# Patient Record
Sex: FEMALE | Race: WHITE | Employment: UNEMPLOYED | URBAN - METROPOLITAN AREA
[De-identification: names, ages, dates, MRNs, and addresses within clinical notes are randomized per-mention and may not be internally consistent; named-entity substitution may affect disease eponyms.]

---

## 2017-08-15 ENCOUNTER — OFFICE VISIT (OUTPATIENT)
Dept: VASCULAR SURGERY | Age: 42
End: 2017-08-15

## 2017-08-15 VITALS
HEART RATE: 68 BPM | RESPIRATION RATE: 13 BRPM | WEIGHT: 140 LBS | BODY MASS INDEX: 21.97 KG/M2 | HEIGHT: 67 IN | SYSTOLIC BLOOD PRESSURE: 118 MMHG | DIASTOLIC BLOOD PRESSURE: 76 MMHG

## 2017-08-15 DIAGNOSIS — I89.0 LYMPHEDEMA OF BOTH LOWER EXTREMITIES: Primary | ICD-10-CM

## 2017-08-15 RX ORDER — LORATADINE 10 MG/1
10 TABLET ORAL
COMMUNITY

## 2017-08-15 RX ORDER — DROSPIRENONE AND ETHINYL ESTRADIOL 0.02-3(28)
KIT ORAL DAILY
COMMUNITY

## 2017-08-15 NOTE — MR AVS SNAPSHOT
Visit Information Date & Time Provider Department Dept. Phone Encounter #  
 8/15/2017 11:00 AM MD [facility] [phone] [id number] Follow-up Instructions Return in about 4 weeks (around 9/12/2017). Follow-up and Disposition History Upcoming Health Maintenance Date Due DTaP/Tdap/Td series (1 - Tdap) 6/16/1996 PAP AKA CERVICAL CYTOLOGY 6/16/1996 INFLUENZA AGE 9 TO ADULT 8/1/2017 Allergies as of 8/15/2017  Review Complete On: 8/15/2017 By: Steven Castorena MD  
 No Known Allergies Current Immunizations  Never Reviewed No immunizations on file. Not reviewed this visit You Were Diagnosed With   
  
 Codes Comments Lymphedema of both lower extremities    -  Primary ICD-10-CM: I89.0 ICD-9-CM: 283.8 Vitals BP Pulse Resp Height(growth percentile) Weight(growth percentile) BMI  
 118/76 (BP 1 Location: Left arm, BP Patient Position: Sitting) 68 13 5' 7\" (1.702 m) 140 lb (63.5 kg) 21.93 kg/m2 Smoking Status Never Smoker BMI and BSA Data Body Mass Index Body Surface Area  
 21.93 kg/m 2 1.73 m 2 Your Updated Medication List  
  
   
This list is accurate as of: 8/15/17 11:40 AM.  Always use your most recent med list.  
  
  
  
  
 CLARITIN 10 mg tablet Generic drug:  loratadine Take 10 mg by mouth. TREVOR (28) 3-0.02 mg Tab Generic drug:  drospirenone-ethinyl estradiol Take  by mouth daily. Follow-up Instructions Return in about 4 weeks (around 9/12/2017). To-Do List   
 08/18/2017 2:00 PM  
  Appointment with [facility] at [facility] ([phone]) DIET RESTRICTIONS  Nothing to eat or drink 4 hours prior to study May have water to take meds  GENERAL INSTRUCTIONS  If you were given medications to take for a contrast allergy prior to having this study, please arrange to have someone drive you to your appointment. If you have had a creatinine level drawn within the past 30 days, please bring most recent results to your appt. This study does not require you to drink contrast prior to your study. MEDICATIONS  Bring a complete list of all medications you are currently taking to include prescriptions, over-the-counter meds, herbals, vitamins & any dietary supplements. OUTSIDE FILMS  Bring outside films, CDs, and reports related to the study with you on the day of your exam.  QUESTIONS  Notify the CT Department if you have any questions concerning your study. [facility] [phone] [facility] 127 [phone]  
  
 08/25/2017 Imaging:  CTA ABDOMEN PELV W CONT Please provide this summary of care documentation to your next provider. If you have any questions after today's visit, please call [phone].

## 2017-08-15 NOTE — PROGRESS NOTES
Tania Meek    Chief Complaint   Patient presents with    New Patient    Varicose Veins       History and Physical    55-year-old female here for evaluation of leg edema. She has no ongoing medical problems tells me she is a very healthy person. Over the past 10 years she has had issues with edema initially only on the right leg. This edema occurs at the foot and ankle. She was standing outside and had multiple ant bites on her left leg and now has chronic edema left leg that goes all the way up to the knee. She is a very disciplined person she wears, stockings constantly during the daytime. She walks on a treadmill stays physically fit. Her leg swelling does go down overnight but re-accumulates every day even with some skin weeping initially in the mornings nearly daily. She has not had any recent cellulitis or infections or ulcers. No shortness of breath no chest pain. She did have history of local melanoma without metastasis right thigh lymph nodes were nonenlarged at that time. She had a CT and full workup for her melanoma in 2011. Certainly there was a concern of the time the lymphedema was from the melanoma but has drawn no correlation especially now the vein bilateral    Past Medical History:   Diagnosis Date    Cancer [facility])      Patient Active Problem List   Diagnosis Code    Lymphedema of both lower extremities I89.0     Past Surgical History:   Procedure Laterality Date    BREAST SURGERY PROCEDURE UNLISTED       Current Outpatient Prescriptions   Medication Sig Dispense Refill    drospirenone-ethinyl estradiol (TREVOR, 28,) 3-0.02 mg tab Take  by mouth daily.  loratadine (CLARITIN) 10 mg tablet Take 10 mg by mouth. No Known Allergies  Social History     Social History    Marital status:      Spouse name: N/A    Number of children: N/A    Years of education: N/A     Occupational History    Not on file.      Social History Main Topics    Smoking status: Never Smoker  Smokeless tobacco: Never Used    Alcohol use Not on file    Drug use: Not on file    Sexual activity: Not on file     Other Topics Concern    Not on file     Social History Narrative    No narrative on file      History reviewed. No pertinent family history. Review of Systems    No history of seizure or stroke, nondiabetic no thyroid issues, denies joint pain, no constipation or diarrhea, no chest pain no hypertension, no history of renal failure or kidney stones, no history of DVT has been ruled out and even had reflux testing with minimal findings, no claudication, chronic leg edema see above, no chronic ulceration    Physical Exam:    Visit Vitals    /76 (BP 1 Location: Left arm, BP Patient Position: Sitting)    Pulse 68    Resp 13    Ht 5' 7\" (1.702 m)    Wt 140 lb (63.5 kg)    BMI 21.93 kg/m2      Healthy youthful appearing 42 no distress  Head is normocephalic pupils are reactive  No facial symmetry notable  Vision speech and hearing intact  Neck no JVD  Chest clear  Cardiac regular  Abdomen soft nondistended  Extremities range of motion strength seem equal  Hard to palpate pulses but hand-held Doppler demonstrates brisk pulses bilateral  Right leg edema noted at toes foot ankle  Left leg edema noted the toes foot ankle and leg to knee  No open ulceration seen     Impression and Plan:    ICD-10-CM ICD-9-CM    1.  Lymphedema of both lower extremities I89.0 457.1 CTA ABDOMEN PELV W CONT   We talked about different stockings for different needs directed toward athletic type stocking she can wear while she is walking, explained she needs 2030 in strength at least  Enrolling her in lymphedema physical therapy  Discussed lymphedema pump, she will evaluate this  We will send for CTA to rule out may Thurner syndrome with his left leg being worse than the right also evaluate any lymphatic enlargement  Orders Placed This Encounter    CTA ABDOMEN PELV W CONT    drospirenone-ethinyl estradiol (TREVOR, 28,) 3-0.02 mg tab    loratadine (CLARITIN) 10 mg tablet       Follow-up Disposition:  Return in about 4 weeks (around 9/12/2017). Jadon Mustafa MD    PLEASE NOTE:  This document has been produced using voice recognition software. Unrecognized errors in transcription may be present.

## 2017-08-18 ENCOUNTER — HOSPITAL ENCOUNTER (OUTPATIENT)
Dept: CT IMAGING | Age: 42
Discharge: HOME | End: 2017-08-18
Attending: SURGERY
Payer: OTHER GOVERNMENT

## 2017-08-18 DIAGNOSIS — I89.0 LYMPHEDEMA OF BOTH LOWER EXTREMITIES: ICD-10-CM

## 2017-08-18 PROCEDURE — 74174 CTA ABD&PLVS W/CONTRAST: CPT

## 2017-08-18 PROCEDURE — 74011636320 HC RX REV CODE- 636/320: Performed by: SURGERY

## 2017-08-18 RX ADMIN — IOPAMIDOL 100 ML: 755 INJECTION, SOLUTION INTRAVENOUS at 14:00

## 2017-09-19 ENCOUNTER — OFFICE VISIT (OUTPATIENT)
Dept: VASCULAR SURGERY | Age: 42
End: 2017-09-19

## 2017-09-19 VITALS
BODY MASS INDEX: 21.97 KG/M2 | DIASTOLIC BLOOD PRESSURE: 76 MMHG | RESPIRATION RATE: 18 BRPM | HEIGHT: 67 IN | SYSTOLIC BLOOD PRESSURE: 112 MMHG | HEART RATE: 64 BPM | WEIGHT: 140 LBS

## 2017-09-19 DIAGNOSIS — I89.0 LYMPHEDEMA OF BOTH LOWER EXTREMITIES: ICD-10-CM

## 2017-09-19 DIAGNOSIS — R60.0 BILATERAL LEG EDEMA: Primary | ICD-10-CM

## 2017-09-19 NOTE — MR AVS SNAPSHOT
Visit Information Date & Time Provider Department Dept. Phone Encounter #  
 9/19/2017  1:15 PM MD [facility] [phone] [id number] Follow-up Instructions Return in about 3 months (around 12/19/2017). Upcoming Health Maintenance Date Due DTaP/Tdap/Td series (1 - Tdap) 6/16/1996 PAP AKA CERVICAL CYTOLOGY 6/16/1996 INFLUENZA AGE 9 TO ADULT 8/1/2017 Allergies as of 9/19/2017  Review Complete On: 9/19/2017 By: MD Cristina  
 No Known Allergies Current Immunizations  Never Reviewed No immunizations on file. Not reviewed this visit You Were Diagnosed With   
  
 Codes Comments Bilateral leg edema    -  Primary ICD-10-CM: R60.0 ICD-9-CM: 443. 3 Lymphedema of both lower extremities     ICD-10-CM: I89.0 ICD-9-CM: 107.5 Vitals BP Pulse Resp Height(growth percentile) Weight(growth percentile) BMI  
 112/76 (BP 1 Location: Left arm, BP Patient Position: Sitting) 64 18 5' 7\" (1.702 m) 140 lb (63.5 kg) 21.93 kg/m2 Smoking Status Never Smoker Vitals History BMI and BSA Data Body Mass Index Body Surface Area  
 21.93 kg/m 2 1.73 m 2 Your Updated Medication List  
  
   
This list is accurate as of: 9/19/17  2:22 PM.  Always use your most recent med list.  
  
  
  
  
 CLARITIN 10 mg tablet Generic drug:  loratadine Take 10 mg by mouth. TREVOR (28) 3-0.02 mg Tab Generic drug:  drospirenone-ethinyl estradiol Take  by mouth daily. Follow-up Instructions Return in about 3 months (around 12/19/2017). To-Do List   
 12/19/2017 Imaging:  DUPLEX LOWER EXT VENOUS BILAT AMB Please provide this summary of care documentation to your next provider. Your primary care clinician is listed as Phys Other. If you have any questions after today's visit, please call [phone].

## 2017-09-19 NOTE — PROGRESS NOTES
Paofarhan Ruiz    Chief Complaint   Patient presents with    Swelling       History and Physical    41-year-old female following up today regarding her bilateral leg edema. She  historically had lymphedema on her right leg with this has in the past few years improved now after a experience with multiple ant bites has had tremendous lymphedema of her left leg. She upgraded to some 20/30 compression stockings and has been wearing these faithfully tells me her legs do seem improved she is also been fitted for and is using her tactile medical lymphedema pump which she tells me is helpful. [facility] She is excited to start this to add this to her care. She has had no chest pain shortness of breath no adverse effects from compression    Past Medical History:   Diagnosis Date    Cancer [facility])      Patient Active Problem List   Diagnosis Code    Lymphedema of both lower extremities I89.0     Past Surgical History:   Procedure Laterality Date    BREAST SURGERY PROCEDURE UNLISTED       Current Outpatient Prescriptions   Medication Sig Dispense Refill    drospirenone-ethinyl estradiol (TREVOR, 28,) 3-0.02 mg tab Take  by mouth daily.  loratadine (CLARITIN) 10 mg tablet Take 10 mg by mouth. No Known Allergies    Review of Systems    A full review of systems was completed times ten organ systems and was deemed negative unless otherwise mentioned in the HPI.     Physical   Visit Vitals    /76 (BP 1 Location: Left arm, BP Patient Position: Sitting)    Pulse 64    Resp 18    Ht 5' 7\" (1.702 m)    Wt 140 lb (63.5 kg)    BMI 21.93 kg/m2        healthy-appearing 42  Head is normocephalic  Pupils are reactive  Neck no JVD  Chest clear  Cardiac regular  Abdomen soft nontender bilateral leg swelling minimal in the right  Moderate  on the left appearance of lymphedema  No skin ulceration  Vascular intact  CAT scan shows what is read as normal aortoiliac segment no  convincing evidence of May Thurner syndrome. Impression/Plan:     ICD-10-CM ICD-9-CM    1. Bilateral leg edema R60.0 782.3 DUPLEX LOWER EXT VENOUS BILAT AMB   2. Lymphedema of both lower extremities I89.0 457.1      She can continue with her compression therapy and lymphedema pump  We will enroll her in physical therapy for lymphedema  We will see her back in 3 months after having worn the stockings for reflux study  Orders Placed This Encounter    DUPLEX LOWER EXT VENOUS BILAT AMB (Reflux)       Follow-up Disposition:  Return in about 3 months (around 12/19/2017). Lisandra Torres MD    PLEASE NOTE:  This document has been produced using voice recognition software. Unrecognized errors in transcription may be present.

## 2017-11-09 ENCOUNTER — DOCUMENTATION ONLY (OUTPATIENT)
Dept: VASCULAR SURGERY | Age: 42
End: 2017-11-09

## 2017-12-20 ENCOUNTER — OFFICE VISIT (OUTPATIENT)
Dept: VASCULAR SURGERY | Age: 42
End: 2017-12-20

## 2017-12-20 DIAGNOSIS — R60.0 BILATERAL LEG EDEMA: ICD-10-CM

## 2017-12-20 NOTE — PROCEDURES
[facility]   *** FINAL REPORT ***    Name: Yanira Alejandro  MRN: [id number]       Outpatient  : 1975  HIS Order #: [id number]  [id number] [facility] Visit #: [id number]  Date: 20 Dec 2017    TYPE OF TEST: Peripheral Venous Testing    REASON FOR TEST  Edema    Right Leg:-  Deep venous thrombosis:           No  Superficial venous thrombosis:    No  Deep venous insufficiency:        No  Superficial venous insufficiency: No    Left Leg:-  Deep venous thrombosis:           No  Superficial venous thrombosis:    No  Deep venous insufficiency:        No  Superficial venous insufficiency: No      INTERPRETATION/FINDINGS  Duplex images were obtained using 2-D gray scale, color flow and  spectral doppler analysis. The reflux exam was performed in the reverse   trendelenburg position. 1. No evidence of deep vein thrombosis or deep venous reflux in the  common femoral, proximal deep femoral, femoral, popliteal, posterior  tibial and peroneal veins bilaterally. 2. No evidence of great saphenous vein reflux from the sapheno-femoral   junction to proximal calf bilaterally. 3. No evidence of small saphenous vein reflux bilaterally in the  proximal, mid and distal segments. The junction was not visualized as   the vein extends up the posterior thigh. 4. Triphasic posterior tibial artery flow bilaterally. ADDITIONAL COMMENTS    I have personally reviewed the data relevant to the interpretation of  this  study. TECHNOLOGIST: Aliza Marley RVT, MARIA FERNANDA  Signed: 2017 03:40 PM    PHYSICIAN: Ml Mosley D.O.   Signed: 2017 02:56 PM

## 2018-01-16 ENCOUNTER — OFFICE VISIT (OUTPATIENT)
Dept: VASCULAR SURGERY | Age: 43
End: 2018-01-16

## 2018-01-16 VITALS
WEIGHT: 140 LBS | RESPIRATION RATE: 16 BRPM | HEIGHT: 67 IN | DIASTOLIC BLOOD PRESSURE: 74 MMHG | BODY MASS INDEX: 21.97 KG/M2 | SYSTOLIC BLOOD PRESSURE: 122 MMHG | HEART RATE: 66 BPM

## 2018-01-16 DIAGNOSIS — I89.0 LYMPHEDEMA OF BOTH LOWER EXTREMITIES: Primary | ICD-10-CM

## 2018-01-16 NOTE — MR AVS SNAPSHOT
[address] 
[phone] Patient: Mindi Baeza MRN: [id number] DFR:[birth year] Visit Information Date & Time Provider Department Dept. Phone Encounter #  
 1/16/2018 10:30 AM MD [facility] [phone] [id number] Follow-up Instructions Return in about 1 year (around 1/16/2019). Follow-up and Disposition History Upcoming Health Maintenance Date Due DTaP/Tdap/Td series (1 - Tdap) 6/16/1996 PAP AKA CERVICAL CYTOLOGY 6/16/1996 Influenza Age 5 to Adult 8/1/2017 Allergies as of 1/16/2018  Review Complete On: 1/16/2018 By: Homero Venegas MD  
 No Known Allergies Current Immunizations  Never Reviewed No immunizations on file. Not reviewed this visit You Were Diagnosed With   
  
 Codes Comments Lymphedema of both lower extremities    -  Primary ICD-10-CM: I89.0 ICD-9-CM: 152.5 Vitals BP Pulse Resp Height(growth percentile) Weight(growth percentile) BMI  
 122/74 (BP 1 Location: Left arm, BP Patient Position: Sitting) 66 16 5' 7\" (1.702 m) 140 lb (63.5 kg) 21.93 kg/m2 Smoking Status Never Smoker Vitals History BMI and BSA Data Body Mass Index Body Surface Area  
 21.93 kg/m 2 1.73 m 2 Your Updated Medication List  
  
   
This list is accurate as of: 1/16/18 11:59 AM.  Always use your most recent med list.  
  
  
  
  
 CLARITIN 10 mg tablet Generic drug:  loratadine Take 10 mg by mouth. TREVOR (28) 3-0.02 mg Tab Generic drug:  drospirenone-ethinyl estradiol Take  by mouth daily. Follow-up Instructions Return in about 1 year (around 1/16/2019). Please provide this summary of care documentation to your next provider. Your primary care clinician is listed as Phys Other.  If you have any questions after today's visit, please call [phone].

## 2018-01-16 NOTE — PROGRESS NOTES
Nilda Solares    Chief Complaint   Patient presents with    Swelling       History and Physical    Most pleasant 70-year-old female here following up today regarding the care of her lymphedema. She tells me she has done very well with her compression therapy. She is also using lymphedema pump which he tells me she is quite pleased with. She uses this daily has become a very important part of her life. She works at [employer] in [place] has been able to maintain comfort at work with her stockings and the lymphedema pump. She did not go for the lymphedema physical therapy but would keep this is thought going forward. No ulceration no acute flareups. No shortness of breath or chest pain. Past Medical History:   Diagnosis Date    Cancer [facility])      Patient Active Problem List   Diagnosis Code    Lymphedema of both lower extremities I89.0     Past Surgical History:   Procedure Laterality Date    BREAST SURGERY PROCEDURE UNLISTED       Current Outpatient Prescriptions   Medication Sig Dispense Refill    drospirenone-ethinyl estradiol (TREVOR, 28,) 3-0.02 mg tab Take  by mouth daily.  loratadine (CLARITIN) 10 mg tablet Take 10 mg by mouth. No Known Allergies    Review of Systems    A full review of systems was completed times ten organ systems and was deemed negative unless otherwise mentioned in the HPI.     Physical   Visit Vitals    /74 (BP 1 Location: Left arm, BP Patient Position: Sitting)    Pulse 66    Resp 16    Ht 5' 7\" (1.702 m)    Wt 140 lb (63.5 kg)    BMI 21.93 kg/m2       Healthy-appearing 42 no distress  Head is normocephalic  Neck no JVD  Chest clear  Cardiac regular  Abdomen soft nondistended  Lower extremities she has tonic edema stable in appearance left side worse than right, affects both legs  No ulceration notable  Reflux Doppler shows no significant reflux of the superficial system  Historical CT shows no convincing evidence of CT venous disorder    Impression/Plan:     ICD-10-CM ICD-9-CM    1. Lymphedema of both lower extremities I89.0 457.1      No orders of the defined types were placed in this encounter. Responds well to conservative care  Continue with stockings, give her prescription for 3040 knee-high pair to see if she likes this  To continue with the lymphedema pump  We talked about using lymphedema physical therapy if she has flareups or worsening of edema  Also talked about if she is at the beach or has days where it is hard to wear the stockings to plan and elevation at the end of the day  Also pleased to hear her when she told me that wearing the stockings gives her a new comfort and confidence with her health    Follow-up Disposition:  Return in about 1 year (around 1/16/2019). Morena Hernandez MD    PLEASE NOTE:  This document has been produced using voice recognition software. Unrecognized errors in transcription may be present.

## 2018-06-27 ENCOUNTER — TELEPHONE (OUTPATIENT)
Dept: VASCULAR SURGERY | Age: 43
End: 2018-06-27

## 2018-06-27 NOTE — TELEPHONE ENCOUNTER
Patient called and needs RX for knee high stockings sent to her, she took it to [facility] and they lost it. She has found another company she is going to try to get her stocking from. She would like them mailed to her.

## 2019-05-30 ENCOUNTER — APPOINTMENT (OUTPATIENT)
Dept: GENERAL RADIOLOGY | Age: 44
End: 2019-05-30
Attending: PHYSICIAN ASSISTANT
Payer: OTHER GOVERNMENT

## 2019-05-30 ENCOUNTER — HOSPITAL ENCOUNTER (EMERGENCY)
Age: 44
Discharge: HOME | End: 2019-05-30
Attending: EMERGENCY MEDICINE
Payer: OTHER GOVERNMENT

## 2019-05-30 VITALS
TEMPERATURE: 97.7 F | DIASTOLIC BLOOD PRESSURE: 74 MMHG | BODY MASS INDEX: 22.76 KG/M2 | HEIGHT: 67 IN | RESPIRATION RATE: 16 BRPM | SYSTOLIC BLOOD PRESSURE: 102 MMHG | OXYGEN SATURATION: 99 % | WEIGHT: 145 LBS | HEART RATE: 51 BPM
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
## 2019-05-30 DIAGNOSIS — S51.812A LACERATION OF LEFT FOREARM, INITIAL ENCOUNTER: ICD-10-CM

## 2019-05-30 LAB — GLUCOSE BLD STRIP.AUTO-MCNC: 160 MG/DL (ref 70–110)

## 2019-05-30 PROCEDURE — 77030039266 HC ADH SKN EXOFIN S2SG -A

## 2019-05-30 PROCEDURE — 90471 IMMUNIZATION ADMIN: CPT

## 2019-05-30 PROCEDURE — 90715 TDAP VACCINE 7 YRS/> IM: CPT | Performed by: PHYSICIAN ASSISTANT

## 2019-05-30 PROCEDURE — 99284 EMERGENCY DEPT VISIT MOD MDM: CPT

## 2019-05-30 PROCEDURE — 75810000293 HC SIMP/SUPERF WND  RPR

## 2019-05-30 PROCEDURE — 82962 GLUCOSE BLOOD TEST: CPT

## 2019-05-30 PROCEDURE — 74011250637 HC RX REV CODE- 250/637: Performed by: EMERGENCY MEDICINE

## 2019-05-30 PROCEDURE — 73130 X-RAY EXAM OF HAND: CPT

## 2019-05-30 PROCEDURE — 74011250636 HC RX REV CODE- 250/636: Performed by: PHYSICIAN ASSISTANT

## 2019-05-30 RX ORDER — HYDROCODONE BITARTRATE AND ACETAMINOPHEN 5; 325 MG/1; MG/1
1 TABLET ORAL
Qty: 12 TAB | Refills: 0 | Status: SHIPPED | OUTPATIENT
Start: 2019-05-30 | End: 2019-06-04

## 2019-05-30 RX ORDER — IBUPROFEN 600 MG/1
600 TABLET ORAL
Status: COMPLETED | OUTPATIENT
Start: 2019-05-30 | End: 2019-05-30

## 2019-05-30 RX ORDER — OXYCODONE AND ACETAMINOPHEN 5; 325 MG/1; MG/1
1 TABLET ORAL
Status: COMPLETED | OUTPATIENT
Start: 2019-05-30 | End: 2019-05-30

## 2019-05-30 RX ORDER — AMOXICILLIN AND CLAVULANATE POTASSIUM 875; 125 MG/1; MG/1
1 TABLET, FILM COATED ORAL
Status: COMPLETED | OUTPATIENT
Start: 2019-05-30 | End: 2019-05-30

## 2019-05-30 RX ORDER — AMOXICILLIN AND CLAVULANATE POTASSIUM 875; 125 MG/1; MG/1
1 TABLET, FILM COATED ORAL 2 TIMES DAILY
Qty: 20 TAB | Refills: 0 | Status: SHIPPED | OUTPATIENT
Start: 2019-05-30 | End: 2019-06-09

## 2019-05-30 RX ORDER — IBUPROFEN 800 MG/1
800 TABLET ORAL
Qty: 20 TAB | Refills: 0 | Status: SHIPPED | OUTPATIENT
Start: 2019-05-30 | End: 2019-06-06

## 2019-05-30 RX ADMIN — IBUPROFEN 600 MG: 600 TABLET ORAL at 19:48

## 2019-05-30 RX ADMIN — OXYCODONE HYDROCHLORIDE AND ACETAMINOPHEN 1 TABLET: 5; 325 TABLET ORAL at 19:48

## 2019-05-30 RX ADMIN — TETANUS TOXOID, REDUCED DIPHTHERIA TOXOID AND ACELLULAR PERTUSSIS VACCINE, ADSORBED 0.5 ML: 5; 2.5; 8; 8; 2.5 SUSPENSION INTRAMUSCULAR at 20:54

## 2019-05-30 RX ADMIN — AMOXICILLIN AND CLAVULANATE POTASSIUM 1 TABLET: 875; 125 TABLET, FILM COATED ORAL at 19:48

## 2019-05-30 NOTE — ED NOTES
I performed a brief evaluation, including history and physical, of the patient here in triage and I have determined that pt will need further treatment and evaluation from the main side ER physician. I have placed initial orders to help in expediting patients care. May 30, 2019 at 5:52 PM - JIMENEZ Dorman      HPI:  Yonas Thurman is a 37 y.o. female here for dog bite to left hand, her two dogs fought over a toy and she tried to break the fight, dogs accidentally bit her, pt and  deny dogs being aggressive. Dogs are up-to-date with immunizations. Pt notes she passes out easily with any sight of blood. Pt arrives pale, hypotensive,  states, pt has frequent drops in BGL, .     Visit Vitals  BP (!) 87/51 (BP 1 Location: Right arm, BP Patient Position: At rest)   Pulse (!) 51   Temp 97.7 °F (36.5 °C)   Resp 16   Ht 5' 7\" (1.702 m)   Wt 65.8 kg (145 lb)   SpO2 99%   BMI 22.71 kg/m²

## 2019-05-30 NOTE — ED PROVIDER NOTES
EMERGENCY DEPARTMENT HISTORY AND PHYSICAL EXAM    Date: 5/30/2019  Patient Name: Traci Oneal    History of Presenting Illness     Chief Complaint   Patient presents with    Dog Bite         History Provided By: Patient and Patient's     Additional History (Context):   6:54 PM  Traci Oneal is a 37 y.o. female with no significant PMHX who presents to the emergency department with C/O animal bite to the left hand and left arm onset PTA. The pt reports that her two pet dogs were fighting over a toy when she intervened and was bitten at the left hand and left forearm. She reports that her dogs are healthy and up to date on vaccinations. The pt reports that she last had tetanus shot updated in 2012 and is requesting tetanus booster shot here in the ED. The patient presents no further medical complaints or concerns to the ED at this time. PCP: Mynor Kramer MD    Current Outpatient Medications   Medication Sig Dispense Refill    amoxicillin-clavulanate (AUGMENTIN) 875-125 mg per tablet Take 1 Tab by mouth two (2) times a day for 10 days. 20 Tab 0    HYDROcodone-acetaminophen (NORCO) 5-325 mg per tablet Take 1 Tab by mouth every four (4) hours as needed for Pain for up to 5 days. Max Daily Amount: 6 Tabs. 12 Tab 0    ibuprofen (MOTRIN) 800 mg tablet Take 1 Tab by mouth every eight (8) hours as needed for Pain for up to 7 days. 20 Tab 0    drospirenone-ethinyl estradiol (TREVOR, 28,) 3-0.02 mg tab Take  by mouth daily.  loratadine (CLARITIN) 10 mg tablet Take 10 mg by mouth. Past History     Past Medical History:  Past Medical History:   Diagnosis Date    Cancer [facility])        Past Surgical History:  Past Surgical History:   Procedure Laterality Date    BREAST SURGERY PROCEDURE UNLISTED         Family History:  History reviewed. No pertinent family history.     Social History:  Social History     Tobacco Use    Smoking status: Never Smoker    Smokeless tobacco: Never Used   Substance Use Topics    Alcohol use: Not on file    Drug use: Not on file       Allergies:  No Known Allergies      Review of Systems   Review of Systems   Constitutional: Negative for chills, diaphoresis, fever and unexpected weight change. HENT: Negative for congestion, drooling, ear pain, rhinorrhea, sore throat, tinnitus and trouble swallowing. Eyes: Negative for photophobia, pain, redness and visual disturbance. Respiratory: Negative for cough, choking, chest tightness, shortness of breath, wheezing and stridor. Cardiovascular: Negative for chest pain, palpitations and leg swelling. Gastrointestinal: Negative for abdominal distention, abdominal pain, anal bleeding, blood in stool, constipation, diarrhea, nausea and vomiting. Endocrine: Negative for cold intolerance, heat intolerance, polydipsia and polyuria. Genitourinary: Negative for difficulty urinating, dysuria, flank pain, frequency, hematuria and urgency. Musculoskeletal: Positive for arthralgias and myalgias. Negative for back pain and neck pain. Skin: Positive for wound (bite). Negative for color change and rash. Allergic/Immunologic: Negative for immunocompromised state. Neurological: Negative for dizziness, seizures, syncope, speech difficulty, light-headedness and headaches. Hematological: Does not bruise/bleed easily. Psychiatric/Behavioral: Negative for agitation, behavioral problems, hallucinations, self-injury and suicidal ideas. The patient is not hyperactive. Physical Exam     Vitals:    05/30/19 1745 05/30/19 1807   BP: (!) 87/51 102/74   Pulse: (!) 51    Resp: 16    Temp: 97.7 °F (36.5 °C)    SpO2: 99%    Weight: 65.8 kg (145 lb)    Height: 5' 7\" (1.702 m)      Physical Exam   Constitutional: She is oriented to person, place, and time. She appears well-developed and well-nourished. No distress. HENT:   Head: Normocephalic and atraumatic.    Right Ear: External ear normal.   Left Ear: External ear normal. Mouth/Throat: Oropharynx is clear and moist. No oropharyngeal exudate. Eyes: Pupils are equal, round, and reactive to light. Conjunctivae and EOM are normal. No scleral icterus. No pallor   Neck: Normal range of motion. Neck supple. No JVD present. No tracheal deviation present. No thyromegaly present. Cardiovascular: Normal rate, regular rhythm and normal heart sounds. Pulmonary/Chest: Effort normal and breath sounds normal. No stridor. No respiratory distress. Abdominal: Soft. Bowel sounds are normal. She exhibits no distension. There is no tenderness. There is no rebound and no guarding. Musculoskeletal: Normal range of motion. She exhibits no edema or tenderness. Noted at the dorsal aspect of the left forearm is a small 1.5 cm triangular avulsion. At the dorsum of the left hand is prominent swelling, likely a hematoma. Sensation and motor function of the hand is intact. Capillary refill of all fingers is instant. Lymphadenopathy:     She has no cervical adenopathy. Neurological: She is alert and oriented to person, place, and time. She has normal reflexes. No cranial nerve deficit. Coordination normal.   Skin: Skin is warm and dry. No rash noted. She is not diaphoretic. No erythema. Psychiatric: She has a normal mood and affect. Her behavior is normal. Judgment and thought content normal.   Nursing note and vitals reviewed.         Diagnostic Study Results     Labs -     Recent Results (from the past 12 hour(s))   GLUCOSE, POC    Collection Time: 05/30/19  5:48 PM   Result Value Ref Range    Glucose (POC) 160 (H) 70 - 110 mg/dL       Radiologic Studies -   XR HAND LT MIN 3 V    (Results Pending)     CT Results  (Last 48 hours)    None        CXR Results  (Last 48 hours)    None          Medications given in the ED-  Medications   diph,Pertuss(AC),Tet Vac-PF (BOOSTRIX) suspension 0.5 mL (0.5 mL IntraMUSCular Given 5/30/19 2054)   amoxicillin-clavulanate (AUGMENTIN) 875-125 mg per tablet 1 Tab (1 Tab Oral Given 5/30/19 1948)   oxyCODONE-acetaminophen (PERCOCET) 5-325 mg per tablet 1 Tab (1 Tab Oral Given 5/30/19 1948)   ibuprofen (MOTRIN) tablet 600 mg (600 mg Oral Given 5/30/19 1948)         Medical Decision Making   I am the first provider for this patient. I reviewed the vital signs, available nursing notes, past medical history, past surgical history, family history and social history. Vital Signs-Reviewed the patient's vital signs. Pulse Oximetry Analysis - 99% on RA     Cardiac Monitor:  Rate: 51 bpm    Records Reviewed: NURSING NOTES AND PREVIOUS MEDICAL RECORDS    Provider Notes (Medical Decision Making):   Pt requesting tetanus booster here in ED. Discussed with pt potential for infection. No signs of underlying foreign body or bony injury. Procedures:  Wound Closure by Adhesive  Date/Time: 5/30/2019 8:52 PM  Performed by: Mihaela Garcia MD  Authorized by: Mihaela Garcia MD     Consent:     Consent obtained:  Verbal    Consent given by:  Patient    Risks discussed:  Infection, pain, tendon damage, vascular damage, poor wound healing, nerve damage, poor cosmetic result and need for additional repair    Alternatives discussed:  No treatment, delayed treatment, observation and referral  Anesthesia (see MAR for exact dosages): Anesthesia method:  None  Laceration details:     Location:  Shoulder/arm    Shoulder/arm location:  L lower arm    Length (cm):  1.5  Repair type:     Repair type:  Simple  Treatment:     Area cleansed with:  Gabrielle    Amount of cleaning:  Standard    Irrigation solution:  Sterile saline    Irrigation method:  Syringe  Skin repair:     Repair method:  Steri-Strips    Number of Steri-Strips:  1  Post-procedure details:     Dressing:  Sterile dressing    Patient tolerance of procedure: Tolerated well, no immediate complications        ED Course:   6:54 PM: Initial assessment performed.  The patients presenting problems have been discussed, and they are in agreement with the care plan formulated and outlined with them. I have encouraged them to ask questions as they arise throughout their visit. Diagnosis and Disposition       DISCHARGE NOTE:    Arin Orellana's  results have been reviewed with her. She has been counseled regarding her diagnosis, treatment, and plan. She verbally conveys understanding and agreement of the signs, symptoms, diagnosis, treatment and prognosis and additionally agrees to follow up as discussed. She also agrees with the care-plan and conveys that all of her questions have been answered. I have also provided discharge instructions for her that include: educational information regarding their diagnosis and treatment, and list of reasons why they would want to return to the ED prior to their follow-up appointment, should her condition change. She has been provided with education for proper emergency department utilization. CLINICAL IMPRESSION:    1. Dog bite, initial encounter    2. Laceration of left forearm, initial encounter        PLAN:  1. D/C Home  2. Current Discharge Medication List      START taking these medications    Details   amoxicillin-clavulanate (AUGMENTIN) 875-125 mg per tablet Take 1 Tab by mouth two (2) times a day for 10 days. Qty: 20 Tab, Refills: 0      HYDROcodone-acetaminophen (NORCO) 5-325 mg per tablet Take 1 Tab by mouth every four (4) hours as needed for Pain for up to 5 days. Max Daily Amount: 6 Tabs. Qty: 12 Tab, Refills: 0    Associated Diagnoses: Dog bite, initial encounter; Laceration of left forearm, initial encounter      ibuprofen (MOTRIN) 800 mg tablet Take 1 Tab by mouth every eight (8) hours as needed for Pain for up to 7 days. Qty: 20 Tab, Refills: 0         CONTINUE these medications which have NOT CHANGED    Details   drospirenone-ethinyl estradiol (TERVOR, 28,) 3-0.02 mg tab Take  by mouth daily. loratadine (CLARITIN) 10 mg tablet Take 10 mg by mouth.            3. Follow-up Information     Follow up With Specialties Details Why Contact Info      In 1 week  [phone]    [facility] EMERGENCY DEPT Emergency Medicine  As needed [address]  [phone]        _______________________________    This note was partially transcribed via voice recognition software. Although efforts have been made to catch any discrepancies, it may contain sound alike words, grammatical errors, or nonsensical words. Scribe Attestation     Ishmael Addison acting as a scribe for and in the presence of Marcy Cid MD      May 30, 2019 at 8:39 PM       Provider Attestation:      I personally performed the services described in the documentation, reviewed the documentation, as recorded by the scribe in my presence, and it accurately and completely records my words and actions.  May 30, 2019 at 8:39 PM - Marcy Cid MD

## 2019-05-30 NOTE — ED TRIAGE NOTES
Broke up dog fight of own dogs. Bit and scratched on left hand and arm. Pt pale and sweaty. POC glucose 160 .  Main tx aware of BP 87/51

## 2019-05-31 NOTE — DISCHARGE INSTRUCTIONS
Patient Education        Cuts Closed With Adhesives: Care Instructions  Your Care Instructions  A cut can happen anywhere on your body. The doctor used an adhesive to close the cut. When the adhesive dries, it forms a film that holds the edges of the cut together. Skin adhesives are sometimes called liquid stitches. If the cut went deep and through the skin, the doctor may have put in a layer of stitches below the adhesive. The deeper layer of stitches brings the deep part of the cut together. These stitches will dissolve and don't need to be removed. You don't see the stitches, only the adhesive. You may have a bandage. The doctor has checked you carefully, but problems can develop later. If you notice any problems or new symptoms, get medical treatment right away. Follow-up care is a key part of your treatment and safety. Be sure to make and go to all appointments, and call your doctor if you are having problems. It's also a good idea to know your test results and keep a list of the medicines you take. How can you care for yourself at home? · Keep the cut dry for the first 24 to 48 hours. After this, you can shower if your doctor okays it. Pat the cut dry. · Don't soak the cut, such as in a bathtub. Your doctor will tell you when it's safe to get the cut wet. · If your doctor told you how to care for your cut, follow your doctor's instructions. If you did not get instructions, follow this general advice:  ? Do not put any kind of ointment, cream, or lotion over the area. This can make the adhesive fall off too soon. ? After the first 24 to 48 hours, wash around the cut with clean water 2 times a day. Do not use hydrogen peroxide or alcohol, which can slow healing. ? If the doctor told you to use a bandage, put on a new bandage after cleaning the cut or if the bandage gets wet or dirty. · Prop up the sore area on a pillow anytime you sit or lie down during the next 3 days.  Try to keep it above the level of your heart. This will help reduce swelling. · Leave the skin adhesive on your skin until it falls off on its own. This may take 5 to 10 days. · Do not scratch, rub, or pick at the adhesive. · Do not put the sticky part of a bandage directly on the adhesive. · Avoid any activity that could cause your cut to reopen. · Be safe with medicines. Read and follow all instructions on the label. ? If the doctor gave you a prescription medicine for pain, take it as prescribed. ? If you are not taking a prescription pain medicine, ask your doctor if you can take an over-the-counter medicine. When should you call for help? Call your doctor now or seek immediate medical care if:    · You have new pain, or your pain gets worse.     · The skin near the cut is cold or pale or changes color.     · You have tingling, weakness, or numbness near the cut.     · The cut starts to bleed.     · You have trouble moving the area near the cut.     · You have symptoms of infection, such as:  ? Increased pain, swelling, warmth, or redness around the cut.  ? Red streaks leading from the cut.  ? Pus draining from the cut.  ? A fever.    Watch closely for changes in your health, and be sure to contact your doctor if:    · The cut reopens.     · You do not get better as expected. Where can you learn more? Go to http://taurus-jose luis.info/. Enter P174 in the search box to learn more about \"Cuts Closed With Adhesives: Care Instructions. \"  Current as of: September 23, 2018  Content Version: 11.9  © 7197-7849 [employer]. Care instructions adapted under license by [employer] (which disclaims liability or warranty for this information). If you have questions about a medical condition or this instruction, always ask your healthcare professional. [place] [address] any warranty or liability for your use of this information.          Patient Education        Animal Bites: Care Instructions  Your Care Instructions  After an animal bite, the biggest concern is infection. The chance of infection depends on the type of animal that bit you, where on your body you were bitten, and your general health. Many animal bites are not closed with stitches, because this can increase the chance of infection. Your bite may take as little as 7 days or as long as several months to heal, depending on how bad it is. Taking good care of your wound at home will help it heal and reduce your chance of infection. The doctor has checked you carefully, but problems can develop later. If you notice any problems or new symptoms, get medical treatment right away. Follow-up care is a key part of your treatment and safety. Be sure to make and go to all appointments, and call your doctor if you are having problems. It's also a good idea to know your test results and keep a list of the medicines you take. How can you care for yourself at home? · If your doctor told you how to care for your wound, follow your doctor's instructions. If you did not get instructions, follow this general advice:  ? After 24 to 48 hours, gently wash the wound with clean water 2 times a day. Do not scrub or soak the wound. Don't use hydrogen peroxide or alcohol, which can slow healing. ? You may cover the wound with a thin layer of petroleum jelly, such as Vaseline, and a nonstick bandage. ? Apply more petroleum jelly and replace the bandage as needed. · After you shower, gently dry the wound with a clean towel. · If your doctor has closed the wound, cover the bandage with a plastic bag before you take a shower. · A small amount of skin redness and swelling around the wound edges and the stitches or staples is normal. Your wound may itch or feel irritated. Do not scratch or rub the wound. · Ask your doctor if you can take an over-the-counter pain medicine, such as acetaminophen (Tylenol), ibuprofen (Advil, Motrin), or naproxen (Aleve). Read and follow all instructions on the label. · Do not take two or more pain medicines at the same time unless the doctor told you to. Many pain medicines have acetaminophen, which is Tylenol. Too much acetaminophen (Tylenol) can be harmful. · If your bite puts you at risk for rabies, you will get a series of shots over the next few weeks to prevent rabies. Your doctor will tell you when to get the shots. It is very important that you get the full cycle of shots. Follow your doctor's instructions exactly. · You may need a tetanus shot if you have not received one in the last 5 years. · If your doctor prescribed antibiotics, take them as directed. Do not stop taking them just because you feel better. You need to take the full course of antibiotics. When should you call for help? Call your doctor now or seek immediate medical care if:    · The skin near the bite turns cold or pale or it changes color.     · You lose feeling in the area near the bite, or it feels numb or tingly.     · You have trouble moving a limb near the bite.     · You have symptoms of infection, such as:  ? Increased pain, swelling, warmth, or redness near the wound. ? Red streaks leading from the wound. ? Pus draining from the wound. ? A fever.     · Blood soaks through the bandage. Oozing small amounts of blood is normal.     · Your pain is getting worse.    Watch closely for changes in your health, and be sure to contact your doctor if you are not getting better as expected. Where can you learn more? Go to http://taurus-jose luis.info/. Enter Q150 in the search box to learn more about \"Animal Bites: Care Instructions. \"  Current as of: September 23, 2018  Content Version: 11.9  © 0877-8013 [employer]. Care instructions adapted under license by [employer] (which disclaims liability or warranty for this information).  If you have questions about a medical condition or this instruction, always ask your healthcare professional. [place] [address] any warranty or liability for your use of this information.

## 2020-10-22 ENCOUNTER — TRANSCRIBE ORDER (OUTPATIENT)
Dept: SCHEDULING | Age: 45
End: 2020-10-22

## 2020-10-22 DIAGNOSIS — Z12.31 VISIT FOR SCREENING MAMMOGRAM: Primary | ICD-10-CM

## 2020-10-26 ENCOUNTER — HOSPITAL ENCOUNTER (OUTPATIENT)
Dept: MAMMOGRAPHY | Age: 45
Discharge: HOME | End: 2020-10-26
Attending: FAMILY MEDICINE
Payer: OTHER GOVERNMENT

## 2020-10-26 DIAGNOSIS — Z12.31 VISIT FOR SCREENING MAMMOGRAM: ICD-10-CM

## 2020-10-26 PROCEDURE — 77063 BREAST TOMOSYNTHESIS BI: CPT

## 2021-11-18 ENCOUNTER — HOSPITAL ENCOUNTER (OUTPATIENT)
Dept: WOMENS IMAGING | Age: 46
Discharge: HOME | End: 2021-11-18
Attending: FAMILY MEDICINE
Payer: OTHER GOVERNMENT

## 2021-11-18 DIAGNOSIS — Z12.31 VISIT FOR SCREENING MAMMOGRAM: ICD-10-CM

## 2021-11-18 PROCEDURE — 77063 BREAST TOMOSYNTHESIS BI: CPT

## 2022-03-19 PROBLEM — I89.0 LYMPHEDEMA OF BOTH LOWER EXTREMITIES: Status: ACTIVE | Noted: 2017-08-15

## 2023-01-31 RX ORDER — DROSPIRENONE AND ETHINYL ESTRADIOL 0.02-3(28)
KIT ORAL DAILY
COMMUNITY

## 2023-01-31 RX ORDER — LORATADINE 10 MG/1
10 TABLET ORAL
COMMUNITY

## 2024-09-23 ENCOUNTER — OFFICE VISIT (OUTPATIENT)
Age: 49
End: 2024-09-23
Payer: OTHER GOVERNMENT

## 2024-09-23 VITALS
BODY MASS INDEX: 23.23 KG/M2 | OXYGEN SATURATION: 98 % | HEART RATE: 82 BPM | HEIGHT: 67 IN | WEIGHT: 148 LBS | SYSTOLIC BLOOD PRESSURE: 120 MMHG | DIASTOLIC BLOOD PRESSURE: 80 MMHG

## 2024-09-23 DIAGNOSIS — I89.0 LYMPHEDEMA: Primary | ICD-10-CM

## 2024-09-23 PROBLEM — E28.2 POLYCYSTIC OVARY SYNDROME: Status: ACTIVE | Noted: 2022-04-26

## 2024-09-23 PROBLEM — Z85.820 HISTORY OF MALIGNANT MELANOMA OF SKIN: Status: ACTIVE | Noted: 2022-04-26

## 2024-09-23 PROBLEM — B35.1 ONYCHOMYCOSIS: Status: ACTIVE | Noted: 2022-04-26

## 2024-09-23 PROBLEM — E78.5 HYPERLIPIDEMIA: Status: ACTIVE | Noted: 2022-04-28

## 2024-09-23 PROCEDURE — 99203 OFFICE O/P NEW LOW 30 MIN: CPT | Performed by: SURGERY

## 2024-11-20 ENCOUNTER — OFFICE VISIT (OUTPATIENT)
Age: 49
End: 2024-11-20

## 2024-11-20 VITALS
BODY MASS INDEX: 23.23 KG/M2 | OXYGEN SATURATION: 99 % | DIASTOLIC BLOOD PRESSURE: 72 MMHG | WEIGHT: 148 LBS | HEART RATE: 100 BPM | SYSTOLIC BLOOD PRESSURE: 122 MMHG | HEIGHT: 67 IN

## 2024-11-20 DIAGNOSIS — I89.0 LYMPHEDEMA: Primary | ICD-10-CM

## 2024-11-20 DIAGNOSIS — I87.1 MAY-THURNER SYNDROME: ICD-10-CM

## 2024-11-20 NOTE — PROGRESS NOTES
Earline Hairston    Chief Complaint   Patient presents with    Lymphedema     6 Week Follow Up with studies       History and Physical    Earline Hairston is a 49 y.o. female with PMH significant for lymphedema.     she returns today for lower extremity edema.     Since her last visit:   - lymphedema pump broke. She was able to obtain a replacement within 10 days from [employer] and has resumed use  - LLE edema has not improved significantly.   - still with lymphorrhea of the left foot      The most recent vascular imaging study was reviewed and discussed with the patient.   1.  IVC duplex (11/20/2024): L common iliac vein compression with increased venous velocities. No DVT    Interpretation Summary         Evidence of compression of the left common iliac vein with increased venous velocities and post dilatation of the left external iliac vein inferior to the compression with reduced venous velocities, suggestive of May Thurner.    Patent IVC, right common, and external iliac veins with no evidence of thrombus and acceptable hemodynamics.     Procedure Details    A gray scale, color Doppler imaging and spectral Doppler analysis ultrasound was performed. During the study longitudinal and transverse views were obtained. Pulsed wave doppler was performed.     Abdominal Findings    IVC/Iliac Veins    The proximal IVC, middle IVC, distal IVC, right common iliac vein, right external iliac vein and left external iliac vein are patent with phasic, spontaneous flow. No evidence of thrombus visualized.     Evidence of compression of the left common iliac vein with increased venous velocities and post dilatation of the left external iliac vein inferior to the compression with reduced venous velocities, suggestive of May Thurner.       Previously obtained venous history:   9/23/2024 visit:  she states she was diagnosed with lymphedema in 2017 and was set up with at home treatment, including [employer]Touch lymphedema pump. She wears

## 2024-12-31 NOTE — FLOWSHEET NOTE
Called to verify arrival time of 0645 for 0800 procedure on 01/07. Pre-procedure instructions verified including NPO after midnight and which meds to take and/or hold. All questions answered, patient verbalized understanding.

## 2025-01-03 ENCOUNTER — TELEPHONE (OUTPATIENT)
Age: 50
End: 2025-01-03

## 2025-01-03 NOTE — TELEPHONE ENCOUNTER
Called and left message @ 12:11pm to remind patient of her upcoming surgery with Dr. Mckinney on 01/07/2025 at [facility]. Waiting for patient to call back to give instructions.

## 2025-01-06 ENCOUNTER — TELEPHONE (OUTPATIENT)
Age: 50
End: 2025-01-06

## 2025-01-06 NOTE — TELEPHONE ENCOUNTER
Spoke to patient on 01/06/2025 at 8:35am about surgery scheduled on 01/07/2025 with Dr. Mckinney  for Iliac Venogram with possible stent placement (possible intervention).     Patient instructions:   Check in at [facility] at 7:45am. Confirmed new time.   Do not eat, drink or chew gum after midnight prior to surgery.   Only take heart and blood pressure medication with a sip of water the morning of the procedure.   Patient instructed to have RIDE available when discharged from hospital. Patient is not allowed to drive, walk or go home using public transportation (including [employer] and [employer]).   Patient given hospital discharge appointment on 01/15/2025 and 1:00pm with Dr. Mckinney.   Patient confirmed and repeated instructions.

## 2025-01-07 ENCOUNTER — HOSPITAL ENCOUNTER (OUTPATIENT)
Facility: HOSPITAL | Age: 50
Setting detail: OUTPATIENT SURGERY
Discharge: HOME | End: 2025-01-07
Attending: SURGERY | Admitting: SURGERY
Payer: OTHER GOVERNMENT

## 2025-01-07 VITALS
SYSTOLIC BLOOD PRESSURE: 97 MMHG | DIASTOLIC BLOOD PRESSURE: 70 MMHG | RESPIRATION RATE: 16 BRPM | HEIGHT: 67 IN | BODY MASS INDEX: 23.54 KG/M2 | WEIGHT: 150 LBS | OXYGEN SATURATION: 97 % | HEART RATE: 70 BPM | TEMPERATURE: 98.3 F

## 2025-01-07 DIAGNOSIS — I89.0 LYMPHEDEMA: ICD-10-CM

## 2025-01-07 DIAGNOSIS — I87.1 MAY-THURNER SYNDROME: ICD-10-CM

## 2025-01-07 LAB
ANION GAP SERPL CALC-SCNC: 6 MMOL/L (ref 3–18)
BASOPHILS # BLD: 0.05 K/UL (ref 0–0.1)
BASOPHILS NFR BLD: 0.5 % (ref 0–2)
BUN SERPL-MCNC: 13 MG/DL (ref 7–18)
BUN/CREAT SERPL: 14 (ref 12–20)
CALCIUM SERPL-MCNC: 8.9 MG/DL (ref 8.5–10.1)
CHLORIDE SERPL-SCNC: 108 MMOL/L (ref 100–111)
CO2 SERPL-SCNC: 24 MMOL/L (ref 21–32)
CREAT SERPL-MCNC: 0.96 MG/DL (ref 0.6–1.3)
DIFFERENTIAL METHOD BLD: NORMAL
EOSINOPHIL # BLD: 0.2 K/UL (ref 0–0.4)
EOSINOPHIL NFR BLD: 2.1 % (ref 0–5)
ERYTHROCYTE [DISTWIDTH] IN BLOOD BY AUTOMATED COUNT: 12.5 % (ref 11.6–14.5)
GLUCOSE SERPL-MCNC: 86 MG/DL (ref 74–99)
HCT VFR BLD AUTO: 41.7 % (ref 35–45)
HGB BLD-MCNC: 13.6 G/DL (ref 12–16)
IMM GRANULOCYTES # BLD AUTO: 0.02 K/UL (ref 0–0.04)
IMM GRANULOCYTES NFR BLD AUTO: 0.2 % (ref 0–0.5)
INR PPP: 1 (ref 0.9–1.1)
LYMPHOCYTES # BLD: 2.13 K/UL (ref 0.9–3.6)
LYMPHOCYTES NFR BLD: 22.2 % (ref 21–52)
MCH RBC QN AUTO: 29.5 PG (ref 24–34)
MCHC RBC AUTO-ENTMCNC: 32.6 G/DL (ref 31–37)
MCV RBC AUTO: 90.5 FL (ref 78–100)
MONOCYTES # BLD: 0.56 K/UL (ref 0.05–1.2)
MONOCYTES NFR BLD: 5.8 % (ref 3–10)
NEUTS SEG # BLD: 6.62 K/UL (ref 1.8–8)
NEUTS SEG NFR BLD: 69.2 % (ref 40–73)
NRBC # BLD: 0 K/UL (ref 0–0.01)
NRBC BLD-RTO: 0 PER 100 WBC
PLATELET # BLD AUTO: 280 K/UL (ref 135–420)
PMV BLD AUTO: 10.7 FL (ref 9.2–11.8)
POTASSIUM SERPL-SCNC: 3.8 MMOL/L (ref 3.5–5.5)
PROTHROMBIN TIME: 13.4 SEC (ref 11.9–14.9)
RBC # BLD AUTO: 4.61 M/UL (ref 4.2–5.3)
SODIUM SERPL-SCNC: 138 MMOL/L (ref 136–145)
WBC # BLD AUTO: 9.6 K/UL (ref 4.6–13.2)

## 2025-01-07 PROCEDURE — C1753 CATH, INTRAVAS ULTRASOUND: HCPCS | Performed by: SURGERY

## 2025-01-07 PROCEDURE — 85025 COMPLETE CBC W/AUTO DIFF WBC: CPT

## 2025-01-07 PROCEDURE — C1725 CATH, TRANSLUMIN NON-LASER: HCPCS | Performed by: SURGERY

## 2025-01-07 PROCEDURE — 7100000010 HC PHASE II RECOVERY - FIRST 15 MIN: Performed by: SURGERY

## 2025-01-07 PROCEDURE — 85610 PROTHROMBIN TIME: CPT

## 2025-01-07 PROCEDURE — 6360000002 HC RX W HCPCS: Performed by: SURGERY

## 2025-01-07 PROCEDURE — 76937 US GUIDE VASCULAR ACCESS: CPT | Performed by: SURGERY

## 2025-01-07 PROCEDURE — C1769 GUIDE WIRE: HCPCS | Performed by: SURGERY

## 2025-01-07 PROCEDURE — 37252 INTRVASC US NONCORONARY 1ST: CPT | Performed by: SURGERY

## 2025-01-07 PROCEDURE — 7100000011 HC PHASE II RECOVERY - ADDTL 15 MIN: Performed by: SURGERY

## 2025-01-07 PROCEDURE — C1876 STENT, NON-COA/NON-COV W/DEL: HCPCS | Performed by: SURGERY

## 2025-01-07 PROCEDURE — C1894 INTRO/SHEATH, NON-LASER: HCPCS | Performed by: SURGERY

## 2025-01-07 PROCEDURE — 76000 FLUOROSCOPY <1 HR PHYS/QHP: CPT | Performed by: SURGERY

## 2025-01-07 PROCEDURE — 6370000000 HC RX 637 (ALT 250 FOR IP): Performed by: SURGERY

## 2025-01-07 PROCEDURE — 80048 BASIC METABOLIC PNL TOTAL CA: CPT

## 2025-01-07 PROCEDURE — 37221 HC ILIAC TERRITORY PLASTY STENT: CPT | Performed by: SURGERY

## 2025-01-07 PROCEDURE — 75825 VEIN X-RAY TRUNK: CPT | Performed by: SURGERY

## 2025-01-07 PROCEDURE — 6360000004 HC RX CONTRAST MEDICATION: Performed by: SURGERY

## 2025-01-07 PROCEDURE — 2709999900 HC NON-CHARGEABLE SUPPLY: Performed by: SURGERY

## 2025-01-07 DEVICE — STENT AB9U16120090 ABRE V01
Type: IMPLANTABLE DEVICE | Status: FUNCTIONAL
Brand: ABRE™

## 2025-01-07 RX ORDER — FENTANYL CITRATE 50 UG/ML
INJECTION, SOLUTION INTRAMUSCULAR; INTRAVENOUS PRN
Status: DISCONTINUED | OUTPATIENT
Start: 2025-01-07 | End: 2025-01-07 | Stop reason: HOSPADM

## 2025-01-07 RX ORDER — HEPARIN SODIUM 1000 [USP'U]/ML
INJECTION, SOLUTION INTRAVENOUS; SUBCUTANEOUS PRN
Status: DISCONTINUED | OUTPATIENT
Start: 2025-01-07 | End: 2025-01-07 | Stop reason: HOSPADM

## 2025-01-07 RX ORDER — CLOPIDOGREL 300 MG/1
300 TABLET, FILM COATED ORAL ONCE
Status: COMPLETED | OUTPATIENT
Start: 2025-01-07 | End: 2025-01-07

## 2025-01-07 RX ORDER — KETOROLAC TROMETHAMINE 30 MG/ML
30 INJECTION, SOLUTION INTRAMUSCULAR; INTRAVENOUS ONCE
Status: SHIPPED | OUTPATIENT
Start: 2025-01-07 | End: 2025-01-12

## 2025-01-07 RX ORDER — HYDROCODONE BITARTRATE AND ACETAMINOPHEN 5; 325 MG/1; MG/1
1 TABLET ORAL EVERY 6 HOURS PRN
Qty: 12 TABLET | Refills: 0 | Status: SHIPPED | OUTPATIENT
Start: 2025-01-07 | End: 2025-01-10

## 2025-01-07 RX ORDER — IODIXANOL 320 MG/ML
INJECTION, SOLUTION INTRAVASCULAR PRN
Status: DISCONTINUED | OUTPATIENT
Start: 2025-01-07 | End: 2025-01-07 | Stop reason: HOSPADM

## 2025-01-07 RX ORDER — KETOROLAC TROMETHAMINE 30 MG/ML
INJECTION, SOLUTION INTRAMUSCULAR; INTRAVENOUS PRN
Status: DISCONTINUED | OUTPATIENT
Start: 2025-01-07 | End: 2025-01-07 | Stop reason: HOSPADM

## 2025-01-07 RX ORDER — KETOROLAC TROMETHAMINE 15 MG/ML
15 INJECTION, SOLUTION INTRAMUSCULAR; INTRAVENOUS ONCE
Status: DISCONTINUED | OUTPATIENT
Start: 2025-01-07 | End: 2025-01-07 | Stop reason: HOSPADM

## 2025-01-07 RX ORDER — OXYCODONE AND ACETAMINOPHEN 5; 325 MG/1; MG/1
1 TABLET ORAL ONCE
Status: COMPLETED | OUTPATIENT
Start: 2025-01-07 | End: 2025-01-07

## 2025-01-07 RX ORDER — MIDAZOLAM HYDROCHLORIDE 1 MG/ML
INJECTION, SOLUTION INTRAMUSCULAR; INTRAVENOUS PRN
Status: DISCONTINUED | OUTPATIENT
Start: 2025-01-07 | End: 2025-01-07 | Stop reason: HOSPADM

## 2025-01-07 RX ORDER — CLOPIDOGREL BISULFATE 75 MG/1
75 TABLET ORAL DAILY
Qty: 30 TABLET | Refills: 3 | Status: SHIPPED | OUTPATIENT
Start: 2025-01-07

## 2025-01-07 RX ORDER — KETOROLAC TROMETHAMINE 10 MG/1
10 TABLET, FILM COATED ORAL EVERY 6 HOURS PRN
Qty: 40 TABLET | Refills: 0 | Status: SHIPPED | OUTPATIENT
Start: 2025-01-07 | End: 2025-01-21

## 2025-01-07 RX ORDER — ONDANSETRON 2 MG/ML
4 INJECTION INTRAMUSCULAR; INTRAVENOUS ONCE
Status: COMPLETED | OUTPATIENT
Start: 2025-01-07 | End: 2025-01-07

## 2025-01-07 RX ORDER — KETOROLAC TROMETHAMINE 15 MG/ML
15 INJECTION, SOLUTION INTRAMUSCULAR; INTRAVENOUS EVERY 6 HOURS PRN
Status: DISCONTINUED | OUTPATIENT
Start: 2025-01-07 | End: 2025-01-07 | Stop reason: HOSPADM

## 2025-01-07 RX ORDER — ATROPINE SULFATE 1 MG/ML
INJECTION, SOLUTION INTRAMUSCULAR; INTRAVENOUS; SUBCUTANEOUS PRN
Status: DISCONTINUED | OUTPATIENT
Start: 2025-01-07 | End: 2025-01-07 | Stop reason: HOSPADM

## 2025-01-07 RX ORDER — SODIUM CHLORIDE 9 MG/ML
INJECTION, SOLUTION INTRAVENOUS PRN
Status: DISCONTINUED | OUTPATIENT
Start: 2025-01-07 | End: 2025-01-07 | Stop reason: HOSPADM

## 2025-01-07 RX ADMIN — ONDANSETRON 4 MG: 2 INJECTION, SOLUTION INTRAMUSCULAR; INTRAVENOUS at 14:44

## 2025-01-07 RX ADMIN — OXYCODONE HYDROCHLORIDE AND ACETAMINOPHEN 1 TABLET: 5; 325 TABLET ORAL at 14:44

## 2025-01-07 RX ADMIN — KETOROLAC TROMETHAMINE 15 MG: 15 INJECTION, SOLUTION INTRAMUSCULAR; INTRAVENOUS at 14:21

## 2025-01-07 RX ADMIN — CLOPIDOGREL BISULFATE 300 MG: 300 TABLET, FILM COATED ORAL at 14:22

## 2025-01-07 ASSESSMENT — PAIN DESCRIPTION - LOCATION
LOCATION: BACK

## 2025-01-07 ASSESSMENT — PAIN DESCRIPTION - ORIENTATION
ORIENTATION: LOWER

## 2025-01-07 ASSESSMENT — PAIN SCALES - GENERAL
PAINLEVEL_OUTOF10: 8
PAINLEVEL_OUTOF10: 7

## 2025-01-07 NOTE — H&P
Patient was seen and examined today. There have been no changes to the H&P from 11/30/2024. Will proceed with venogram, possible intervention.   The risks and benefits of this procedure were discussed. The patient voiced understanding. All questions were answered and the patient opted to proceed.        History and Physical    Earline Hairston is a 49 y.o. female with PMH significant for lymphedema.      she returns today for lower extremity edema.      Since her last visit:   - continues using lymphedema pump  - LLE edema has not improved significantly.   - still with lymphorrhea of the left foot        The most recent vascular imaging study was reviewed and discussed with the patient.   1.  IVC duplex (11/20/2024): L common iliac vein compression with increased venous velocities. No DVT     Interpretation Summary          Evidence of compression of the left common iliac vein with increased venous velocities and post dilatation of the left external iliac vein inferior to the compression with reduced venous velocities, suggestive of May Thurner.    Patent IVC, right common, and external iliac veins with no evidence of thrombus and acceptable hemodynamics.     Procedure Details     A gray scale, color Doppler imaging and spectral Doppler analysis ultrasound was performed. During the study longitudinal and transverse views were obtained. Pulsed wave doppler was performed.      Abdominal Findings     IVC/Iliac Veins     The proximal IVC, middle IVC, distal IVC, right common iliac vein, right external iliac vein and left external iliac vein are patent with phasic, spontaneous flow. No evidence of thrombus visualized.     Evidence of compression of the left common iliac vein with increased venous velocities and post dilatation of the left external iliac vein inferior to the compression with reduced venous velocities, suggestive of May Thurner.         Previously obtained venous history:   9/23/2024 visit:  she states she

## 2025-01-07 NOTE — PROGRESS NOTES
AVS Discharge instructions reviewed with patient and copy given to patient.  All questions answered and all medications reviewed with the patient, including when to resume medications.  Patient verbalized understanding to all discharge instructions.  PIV removed. Procedural site within normal limits.  No hematoma or bleeding noted from procedural and PIV site. No pain noted at discharge. Patient back to neurological baseline, alert and oriented times 4. Patient discharged in the presence of a responsible adult () who will accompany patient home and is able to report post procedure complications.

## 2025-01-07 NOTE — PROGRESS NOTES
Ketorolac dose was capped at 15 mg per P&T policy.    Thank you,  Guerrero Moe [facility]  1/7/2025, 8:39 AM

## 2025-01-07 NOTE — PRE SEDATION
Sedation Plan  ASA: class 2 - patient with mild systemic disease     Mallampati class: III - soft palate, base of uvula visible.    Sedation plan: local anesthesia and moderate (conscious sedation)    Risks, benefits, and alternatives discussed with patient and spouse.  Use of blood products discussed with patient and spouse who consented to blood products.       Immediate reassessment prior to sedation:  Patient's status reviewed and vital signs assessed; acceptable to perform procedure and proceed to administer sedation as planned.        Marivel Mckinney MD PhD  Vascular Surgery

## 2025-01-07 NOTE — PROGRESS NOTES
Pt out of bed and ambulated around cath holding, pt walks with steady gait, no signs of bleeding from access site.

## 2025-01-07 NOTE — DISCHARGE INSTRUCTIONS
Procedure(s) Performed: Left common iliac vein angioplasty and stenting    Surgeon: Marivel Mckinney MD PhD    Discharge Instructions:    Please do not shower for the next 36 hours. After that time, you may shower as needed. Allow the soap and water to run over your puncture site and gently pat dry. No tub bathing for 6-8 weeks.   Please check the puncture site and upper thigh daily. Some bruising is expected. The bruising may spread further down your thigh and leg as you start walking more but there should be no bleeding. If you notice bleeding, swelling or increase in bruising, please apply constant pressure to the access site and call the vascular surgery clinic.  If there are any concerns regarding the access site, please call the vascular clinic or seek emergency care immediately.   Take toradol for pain as indicated. Do not take other NSAIDs (ibuprofen, aleve etc) while taking toradol. If toradol is not sufficient to treat the pain, you may take vicodin in addition to toradol.   You have been started on a new medication (Plavix, generic: clopidogrel). Please start taking 75mg of plavix daily, starting on Wednesday, 1/8/2025.   Please do not lift, push or pull anything over 10lbs for the next 2 weeks. Please do not have sex for the next 2 weeks.    Please refrain from straining or high impact exercises for the first 5 days. You may walk, climb steps as long as you do not have excessive swelling or pain at the puncture site.   Please rest for the next 4-5 days  Follow up in vascular surgery clinic with Dr. Mckinney as scheduled.   Call our clinic and/or seek emergency care if you develop a fever, chills, shortness of breath, severe pain, wound drainage, wound redness, or any other concerns.     Vascular Clinic Phone Number: [phone]

## 2025-01-07 NOTE — PROGRESS NOTES
Cath holding summary     Patient escorted to cath holding from waiting area ambulatory, alert and oriented x 4, voicing no complaints.  Changed into gown and placed on monitor.   NPO since MN.  Lab results, med rec and H&P reviewed on chart.  PIV x 2 inserted without difficulty. Family at bedside.    1200  TRANSFER - IN REPORT:    1200: Verbal report received from Kimberli  on Earline Hairston being received from [facility]  for ordered procedure. Report consisted of patient's Situation, Background, Assessment and Recommendations (SBAR). Information from the following report(s) Nurse Handoff Report, Surgery Report, Intake/Output, MAR, and Recent Results was reviewed with the receiving nurse. Opportunity for questions and clarification was provided. Assessment completed upon patient's arrival to unit and care assumed. A/Ox4 and VSS.  Procedure: Lower Extremity Angiogram  Intervention: Yes    If yes, antiplatelet administered: Plavix  Site: Left, Groin/upper thigh  Pain: 6/10    1300  20 manual pressure held to access eight of left upper thigh by TALIA castellanos. Covered with quikclot and tegaderm, wrapped in ace wrap. RAYMOND. Bulmaro potts.     1500  Ambulated patient to the bathroom and began to ooze. Placed patient in near by stretcher and held manual pressure. Notifed Dr. Mckinney.  Will apply 15 minutes MP and then placed ace wrap from toes to above access site in left thigh.

## 2025-01-07 NOTE — BRIEF OP NOTE
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 Angioplasty of left common iliac artery  4. Insert stent left common iliac artery      Surgeon(s):  Marivel Mckinney MD    Assistant:  * No surgical staff found *    Anesthesia: IV Sedation    Estimated Blood Loss (mL): Minimal    Complications: None    Specimens:   * No specimens in log *    Implants:  Implant Name Type Inv. Item Serial No.  Lot No. LRB No. Used Action   STENT VASC SELF EXPND 16X120 MM ANTELMO NIT ABRE - [id number] Peripheral stents STENT VASC SELF EXPND 16X120 MM ANTELMO NIT ABRE  [employer]-WD [id number]  1 Implanted         Drains: * No LDAs found *    Findings:  Infection Present At Time Of Surgery (PATOS) (choose all levels that have infection present):  No infection present  Other Findings: see full operative note    Electronically signed by Marivel Mckinney MD on 1/7/2025 at 11:50 AM

## 2025-01-08 LAB — ECHO BSA: 1.79 M2

## 2025-01-08 NOTE — OP NOTE
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]     * Lymphedema [I89.0]    Post-Op Diagnosis: Same    Complications: None    Surgeon: Marivel Mckinney MD PhD    Assistant: Jacquie Mendez    Procedure:   Ultrasound guided access of the left great saphenous vein in the proximal thigh, images stored for documentation purposes  Peripheral and central venogram   Intravascular ultrasound evaluation of the external and common iliac veins  Balloon Angioplasty of left common iliac vein with 14x60 mm angioplasty balloon  Stenting of the left common and external iliac veins with 16x120 mm Medtronic Abre self-expandable bare metal stent  Supervision and interpretation of all radiographic imaging    Findings:   Widely patent left common femoral vein, external iliac vein, distal and mid common femoral vein and inferior vena cava  Severe external compression of the proximal left common iliac vein by the overlying right common iliac artery  Successful angioplasty and stenting of the left common iliac vein, extending into the left external iliac vein with resolution of compression  Minimal stent encroachment into the IVC confluence    Anesthesia: Moderate sedation was administered by a qualified  under my direct supervision. Sedation start time: 1039, sedation end time: 1125. 1mg Versed, 50mcg fentanyl were administered. 6 ml of lidocaine were instilled at the access site.    Complications: Angioplasty-induced bradycardia to the high 20s, symptomatic with lightheadedness and nausea. Treated successfully with 0.5mg atropine IV with resolution of symptoms and stabilization of heart rate.    Condition and Disposition: stable to recovery, will discharge home once appropriate    Indication:   This is a 49 y.o. female with PMH significant for PCOS, HLD and lymphedema who presented with worsening LLE

## 2025-01-15 ENCOUNTER — OFFICE VISIT (OUTPATIENT)
Age: 50
End: 2025-01-15

## 2025-01-15 VITALS
HEIGHT: 67 IN | BODY MASS INDEX: 23.23 KG/M2 | HEART RATE: 82 BPM | WEIGHT: 148 LBS | DIASTOLIC BLOOD PRESSURE: 90 MMHG | SYSTOLIC BLOOD PRESSURE: 120 MMHG | OXYGEN SATURATION: 98 %

## 2025-01-15 DIAGNOSIS — I87.1 MAY-THURNER SYNDROME: Primary | ICD-10-CM

## 2025-01-15 RX ORDER — CLOPIDOGREL BISULFATE 75 MG/1
75 TABLET ORAL DAILY
Qty: 90 TABLET | Refills: 3 | Status: SHIPPED | OUTPATIENT
Start: 2025-01-15

## 2025-01-15 NOTE — PROGRESS NOTES
Earline Hairston    Chief Complaint   Patient presents with    Lymphedema     Follow up        History and Physical    Earline Hairston  is a 49 y.o. female with May-Thurner syndrome    she returns in follow up after left common iliac vein stenting on 1/7/2025.  A 16 x 120 mm Medtronic Abre self-expandable bare-metal stent was placed. This procedure was originally performed for left lower extremity edema and pain.     Today the patient reports no significant improvement of her edema. Still has drainage involving the left foot.   Pain in her back has finally lessened and has almost fully resolved.   Has been taking plavix daily.     States bruising on her thigh has improved.       Physical Exam:    Vitals:    01/15/25 1315   BP: (!) 120/90   Pulse: 82   SpO2: 98%       Constitutional:  Patient is well developed, well nourished, and not distressed.   Pulmonary/Chest: Effort normal   Extremities: Normal range of motion. L>R leg non-pitting, trace LLE pitting edema. Digits no cyanosis or clubbing. Minimal ecchymosis in the left medial thigh. Small area of eschar overlying access site  Skin:  Skin is warm and dry. No rash noted. No erythema. No ulcers.       Impression and Plan:  49 y.o. female s/p L CIV stenting 8 days ago.    Recovering as expected from procedure    - restart lymphedema pump daily  - start walking daily  - daily compression stockings  - follow up in 2 weeks with IVC duplex    Plavix 75mg PO daily [facility]     We reviewed the plan with the patient and the patient understands.    Past Medical History:   Diagnosis Date    Cancer (HCC)        Past Surgical History:   Procedure Laterality Date    BREAST SURGERY      CARDIAC PROCEDURE Left 1/7/2025    Intravascular ultrasound performed by Marivel Mckinney MD at [facility] CARDIAC CATH LAB    IMPLANT BREAST SILICONE/EQ Bilateral     Prepectoral Saline Implants since 2004    INVASIVE VASCULAR N/A 1/7/2025    Venogram lower ext bilat/ILLIAC VENOGRAM

## 2025-01-21 ENCOUNTER — TELEPHONE (OUTPATIENT)
Age: 50
End: 2025-01-21

## 2025-01-21 NOTE — TELEPHONE ENCOUNTER
Pt called in on 1/21/25 stating she is out of  the current medicine:ketorolac (TORADOL) 10 MG tablet Pr stated she is still having severe pain and is in need of a refill. Please Advise and send to pharmacy on file.

## 2025-01-23 ENCOUNTER — TELEPHONE (OUTPATIENT)
Age: 50
End: 2025-01-23

## 2025-01-23 RX ORDER — KETOROLAC TROMETHAMINE 10 MG/1
10 TABLET, FILM COATED ORAL EVERY 6 HOURS PRN
Qty: 40 TABLET | Refills: 0 | Status: SHIPPED | OUTPATIENT
Start: 2025-01-23 | End: 2025-02-06

## 2025-01-27 NOTE — PROGRESS NOTES
Earline Hairston    Chief Complaint   Patient presents with    May-Thurner Syndrome     Follow up with studies        History and Physical    Earline Hairston  is a 49 y.o. female with May-Thurner syndrome    she returns in follow up after left common iliac vein stenting on 1/7/2025.  A 16 x 120 mm Medtronic Abre self-expandable bare-metal stent was placed. This procedure was originally performed for left lower extremity edema and pain.     Today the patient reports some improvement of her edema and decrease of lymphorrhea. Edema improves overnight.   - wearing compression stockings and lymphedema pump daily  - has started walking on treadmill which causes some pain in the back    The most recent vascular imaging study was reviewed and discussed with the patient.   1/29/25: IVC duplex shows patent stent without evidence of thrombosis    Interpretation Summary  Show Result Comparison     Technically difficult exam secondary to excessive bowel gas.    Patent left common iliac vein stent without evidence of thrombus.    Patent IVC, right common iliac and bilateral external iliac veins without evidence of thrombus.     Procedure Details    A gray scale, color Doppler imaging and spectral Doppler analysis ultrasound was performed. During the study longitudinal and transverse views were obtained. Pulsed wave doppler was performed. Overall the study quality was technically difficult. Study was technically difficult due to: bowel gas.     Abdominal Findings    IVC/Iliac Veins    The proximal IVC, middle IVC, distal IVC, right common iliac vein, right external iliac vein and left external iliac vein are patent with phasic, spontaneous flow. No evidence of thrombus visualized.   Left Iliac Vein Stent: Proximal, middle and distal with phasic, spontaneous flow. No evidence of thrombus visualized.         Physical Exam:    Vitals:    01/29/25 0907   BP: (!) 130/98   Pulse: 89   SpO2: 97%         Constitutional:  Patient is well

## 2025-01-29 ENCOUNTER — OFFICE VISIT (OUTPATIENT)
Age: 50
End: 2025-01-29

## 2025-01-29 VITALS
WEIGHT: 148 LBS | HEIGHT: 67 IN | OXYGEN SATURATION: 97 % | HEART RATE: 89 BPM | BODY MASS INDEX: 23.23 KG/M2 | SYSTOLIC BLOOD PRESSURE: 130 MMHG | DIASTOLIC BLOOD PRESSURE: 98 MMHG

## 2025-01-29 DIAGNOSIS — I87.1 MAY-THURNER SYNDROME: ICD-10-CM

## 2025-01-29 DIAGNOSIS — I89.0 LYMPHEDEMA: Primary | ICD-10-CM

## 2025-05-12 ENCOUNTER — OFFICE VISIT (OUTPATIENT)
Age: 50
End: 2025-05-12
Payer: OTHER GOVERNMENT

## 2025-05-12 VITALS
WEIGHT: 150 LBS | OXYGEN SATURATION: 99 % | SYSTOLIC BLOOD PRESSURE: 134 MMHG | HEART RATE: 84 BPM | BODY MASS INDEX: 23.54 KG/M2 | DIASTOLIC BLOOD PRESSURE: 78 MMHG | HEIGHT: 67 IN

## 2025-05-12 DIAGNOSIS — I89.0 LYMPHEDEMA: ICD-10-CM

## 2025-05-12 DIAGNOSIS — I87.1 MAY-THURNER SYNDROME: Primary | ICD-10-CM

## 2025-05-12 PROCEDURE — 99213 OFFICE O/P EST LOW 20 MIN: CPT | Performed by: SURGERY

## 2025-05-12 NOTE — PROGRESS NOTES
Earline Hairston    Chief Complaint   Patient presents with    Lymphedema     F/u with studies       History and Physical    Earline Hairston  is a 49 y.o. female with May-Thurner syndrome    she returns in follow up after left common iliac vein stenting on 1/7/2025.  A 16 x 120 mm Medtronic Abre self-expandable bare-metal stent was placed. This procedure was originally performed for left lower extremity edema and pain.     Since her last visit:   - has been using her lymphedema pump daily, wearing stockings  - still experiencing some lymphorrhea on the left foot, especially when using lymphedema pump  - she feels like she is making progress every month  - back pain with walking has resolved    The most recent vascular imaging study was reviewed and discussed with the patient.   1.  IVC duplex (5/12/2025): widely patent left common iliac vein stent    Interpretation Summary         Patent left common iliac vein stent without evidence of thrombosis.    Bilateral common and external iliac veins are grossly patent in the areas assessed with color and spectral doppler. With and without compression not performed due to poor visualization in gray scale.    Patent inferior vena cava, common and external iliac veins with phasic, spontaneous flow. No evidence of thrombus visualized.       Physical Exam:    Vitals:    05/12/25 0844   BP: 134/78   Pulse: 84   SpO2: 99%       Constitutional:  Patient is well developed, well nourished, and not distressed.   Pulmonary/Chest: Effort normal   Extremities: Normal range of motion. L>R leg non-pitting, trace LLE pitting edema. Digits no cyanosis or clubbing. Minimal ecchymosis in the left medial thigh. Small area of eschar overlying access site  Skin:  Skin is warm and dry. No rash noted. No erythema. No ulcers.       Impression and Plan:  49 y.o. female s/p L CIV stenting for May-Thurner syndrome and LLE lymphedema.     May-Thurner syndrome, s/p L CIV stenting on 1/7/2025. IVC duplex

## (undated) DEVICE — INTRODUCER SHTH 6FR CANN L11CM DIL TIP 35MM GRN TUNGSTEN

## (undated) DEVICE — ATLAS® GOLD PTA DILATATION CATHETER 16 MM X 60 MM, 80 CM CATHETER: Brand: ATLAS® GOLD

## (undated) DEVICE — Device: Brand: VISIONS PV .035 DIGITAL IVUS CATHETER

## (undated) DEVICE — 4FR MICROPUNCTURE KIT

## (undated) DEVICE — Device

## (undated) DEVICE — TOWEL SURG W17XL27IN STD BLU COT NONFENESTRATED PREWASHED

## (undated) DEVICE — ATLAS® GOLD PTA DILATATION CATHETER 14 MM X 60 MM, 80 CM CATHETER: Brand: ATLAS® GOLD

## (undated) DEVICE — RADIFOCUS GLIDEWIRE ADVANTAGE GUIDEWIRE: Brand: GLIDEWIRE ADVANTAGE

## (undated) DEVICE — PROCEDURE KIT FLUID MGMT 10 FR CUST MAINFOLD